# Patient Record
(demographics unavailable — no encounter records)

---

## 2025-04-22 NOTE — DISCUSSION/SUMMARY
[FreeTextEntry1] : Tin is a 9 year old coming in for his 9 year old HCM visit. He currently is in 3rd grade, has IEP and receives speech therapy in school. Passed hearing screen, but unable to tolerate vision screen. Parents have no concerns today.  Plan:  #HCM:  - Follow-up in 1 year or sooner if concerns  - Follow-up with pediatric dentist every 6 months  - Follow-up with pediatric ophthalmologist yearly  - Has IEP and is receiving services in school  - Anticipatory guidance and summer safety reviewed  - SDOH domains were screened and scored.  #Chromosomal deletion:  - Last seen by genetics in 2017; no concerns at this time  #Pes planus of both feet: - Parents concerned that gait has not improved since last ortho follow-up in 2021 - On exam patient is walking and running comfortably without pain - Recommend using insoles for comfort; can follow up with ortho if symptoms significantly worsen  #Neurology:  - Continue to take valproic acid 250 BID - Due for follow up appointment in June.

## 2025-04-22 NOTE — HISTORY OF PRESENT ILLNESS
[Father] : father [Normal] : Normal [Brushing teeth twice/d] : brushing teeth twice per day [Yes] : Patient goes to dentist yearly [Toothpaste] : Primary Fluoride Source: Toothpaste [Grade ___] : Grade [unfilled] [FreeTextEntry7] : None [de-identified] : None [de-identified] : Eating more outside too. Drinks water, juice, and milk.  [FreeTextEntry3] : Goes to sleep at 930pm and wakes up at 7am. Sleeps with dad and sister.   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 Previously was getting therapies. About to get an IEP evaluation.  [FreeTextEntry1] : Ophthalmology:   - last saw eye doctor around 6 months ago, forgot glasses at home today.    Neurology: Last GTC in Nov when mom trialed decreasing valproic acid dose Currently on valproic acid 250 mg BID Last neurology appt on on Sept 4, 2024, due for follow-up appt.   Orthopedics: Last appt on April 27, 2021 Hx of significant delay in walking, did not walk until 3 years of age. Noted to have pesplanovalgus. No concerns from orthopedics at that time. Parents concerned that patient's left foot is dragging and out-turned out.  Sometimes complains of back pain.   Genetics: Last visit on Nov 16, 2017 and last genetics counselor conversation with family on Dec 13, 2017. 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 This deletion encompasses 9 OMIM annotated genes, ROBO1, ROBO2, GBE1 (associated to AR GSD IV and to polyglucosan body neuropathy), CADM2, CHMP2B, POU1F, HTR1F and CGGBP1.  CHMP2B is associated to AD frontotemporal dementia and Amyotrophic lateral sclerosis 17 (ALS 17). Both are adult-onset diseases, that would not impact Tin presently, and are usually associated to missense mutations, not gene deletions.  - Based on recent literature, deletions encompassing ROBO1 have been found to be associated with developmental delay, hence they would be the explanation of Tin's symptoms. Parental testing to determine whether the deletion is "de evonne" or inherited was recommended. Maternal sample was obtained, father will come to provide the sample in the next few days.  - Germline deletions encompassing ROBO1 have also been recently reported in patients with hereditary breast and colorectal carcinoma (Josue Syndrome). As the mother was inclined to obtain more information in this regards, I referred her for a cancer genetics consultation. She appreciated and made an appointment for consultation. We are available to discuss any of the information further with the parents, should they have any additional question.